# Patient Record
(demographics unavailable — no encounter records)

---

## 2024-11-19 NOTE — ASSESSMENT
[FreeTextEntry1] : Patient is a 68-year-old male seen in the gastroenterology office today prior to scheduling screening colonoscopy.  Patient denied having a prior colonoscopy.  Patient denied family history of colon cancer or rectal cancer.  Will schedule patient for colonoscopy for colon cancer screening.  Discussed indication, benefits/risks and alternatives to colonoscopy with the patient and his son today.  They reported understanding and they are in agreement with proceeding with colonoscopy for the patient.  All of their questions were answered.       --Schedule colonoscopy.

## 2024-11-19 NOTE — PHYSICAL EXAM
[Alert] : alert [No Acute Distress] : no acute distress [Sclera] : the sclera and conjunctiva were normal [Oropharynx] : the oropharynx was normal [Normal Appearance] : the appearance of the neck was normal [No Respiratory Distress] : no respiratory distress [Auscultation Breath Sounds / Voice Sounds] : lungs were clear to auscultation bilaterally [Heart Rate And Rhythm] : heart rate was normal and rhythm regular [Normal S1, S2] : normal S1 and S2 [None] : no edema [Bowel Sounds] : normal bowel sounds [Abdomen Tenderness] : non-tender [Abdomen Soft] : soft [Rebound Tenderness] : no rebound tenderness [No CVA Tenderness] : no CVA  tenderness [Abnormal Walk] : normal gait [Normal Color / Pigmentation] : normal skin color and pigmentation [Oriented To Time, Place, And Person] : oriented to person, place, and time [Normal Affect] : the affect was normal [Normal Mood] : the mood was normal

## 2024-11-19 NOTE — HISTORY OF PRESENT ILLNESS
[FreeTextEntry1] : Patient is a 68-year-old male who presents to the gastroenterology office for evaluation prior to scheduling screening colonoscopy.  Patient is accompanied to the office visit by his son.  Patient reports no acute complaints or symptoms during office visit today.  Patient denies having a prior colonoscopy.  Patient denies experiencing chest pain, shortness of breath, abdominal pain, nausea or vomiting.  Patient denies any acute changes in bowel habits.  Patient reports having 1 bowel movement per day consisting of formed stools.  Patient denies red blood in stools and denies black stools.  Patient denies family history of colon cancer or rectal cancer.  Past medical history/Past surgical history: Patient reports history of diabetes. Patient reports history of inguinal hernia repair surgery.  Family history: Patient reports family history of prostate cancer in his father. Patient denies family history of colon cancer or rectal cancer.  Social history: Patient denies tobacco smoking. Patient denies alcohol use.